# Patient Record
Sex: FEMALE | Race: ASIAN | NOT HISPANIC OR LATINO | URBAN - METROPOLITAN AREA
[De-identification: names, ages, dates, MRNs, and addresses within clinical notes are randomized per-mention and may not be internally consistent; named-entity substitution may affect disease eponyms.]

---

## 2022-11-24 ENCOUNTER — EMERGENCY (EMERGENCY)
Facility: HOSPITAL | Age: 32
LOS: 1 days | Discharge: HOME | End: 2022-11-24
Attending: EMERGENCY MEDICINE | Admitting: EMERGENCY MEDICINE
Payer: SELF-PAY

## 2022-11-24 VITALS
DIASTOLIC BLOOD PRESSURE: 64 MMHG | RESPIRATION RATE: 16 BRPM | TEMPERATURE: 98 F | OXYGEN SATURATION: 99 % | HEART RATE: 92 BPM | SYSTOLIC BLOOD PRESSURE: 127 MMHG

## 2022-11-24 PROCEDURE — 99284 EMERGENCY DEPT VISIT MOD MDM: CPT

## 2022-11-24 NOTE — ED ADULT NURSE REASSESSMENT NOTE - NS ED NURSE REASSESS COMMENT FT1
patient had one episode of vomiting. patient refused medication interventions at this time. manager at bedside.

## 2022-11-24 NOTE — ED ADULT NURSE NOTE - OBJECTIVE STATEMENT
32 y/o female bebems for eval for AMS. as per EMS patient had a syncopal episode while at work. patient admitted to EMS Ketamine use and 3 alcoholic drinks tonight. patient denies pain/injury. no visible injuries noted upon assessment. patient made one attempt during triage to leave prior to being evaluated by MD. patient encouraged to stay. MD Toney aware. father on the way for pickup when medically discharged.

## 2022-11-24 NOTE — ED PROVIDER NOTE - PATIENT PORTAL LINK FT
You can access the FollowMyHealth Patient Portal offered by [facility] by registering at the following website: [email or web address]. By joining [employer]’s FollowMyHealth portal, you will also be able to view your health information using other applications (apps) compatible with our system.

## 2022-11-24 NOTE — ED PROVIDER NOTE - PROGRESS NOTE DETAILS
clinically sober with steady gait clear speech, tolerated PO. No medical complaints, stable for dc home. Pts manager came to get her.

## 2022-11-24 NOTE — ED PROVIDER NOTE - PHYSICAL EXAMINATION
VSS in NAD intoxicated   NCAT EOMI PERRL OP clear  heart RRR no murmur   lungs CTA no wheezing no rales no rhonchi   abd soft NT ND no CVAT no guarding no rebound   normal neuro exam CN I-XII grossly intact, no groos motor or sensory deficits  no peripheral c/c/e

## 2022-11-24 NOTE — ED PROVIDER NOTE - OBJECTIVE STATEMENT
31-year-old female brought in by EMS after she was found intoxicated at her workplace.  According to EMS bystanders stated she looked like she was going to have a seizure.  She had no LOC, no head trauma.  Was behaving erratic and uncooperative with EMS.  On arrival demanding to leave.  But exhibits slurred speech, and unsteady gait.  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

## 2022-11-28 DIAGNOSIS — F19.929 OTHER PSYCHOACTIVE SUBSTANCE USE, UNSPECIFIED WITH INTOXICATION, UNSPECIFIED: ICD-10-CM

## 2023-08-21 NOTE — ED ADULT NURSE NOTE - TEMPLATE
D approval  Requesting refill Hydrocodone-Acetaminophen 10-325mg #30, Lorazepam 2mg #180, and Tramadol 50mg #180 [facility]  Last fill for Norco- 7/25/23  Last fill for Lorazepam and Tramadol 5/12/23  Please advise- pended   General